# Patient Record
Sex: MALE | Race: WHITE
[De-identification: names, ages, dates, MRNs, and addresses within clinical notes are randomized per-mention and may not be internally consistent; named-entity substitution may affect disease eponyms.]

---

## 2018-09-15 ENCOUNTER — HOSPITAL ENCOUNTER (EMERGENCY)
Dept: HOSPITAL 7 - FB.ED | Age: 43
Discharge: HOME | End: 2018-09-15
Payer: COMMERCIAL

## 2018-09-15 DIAGNOSIS — Z93.6: ICD-10-CM

## 2018-09-15 DIAGNOSIS — I10: ICD-10-CM

## 2018-09-15 DIAGNOSIS — Z79.899: ICD-10-CM

## 2018-09-15 DIAGNOSIS — Z88.1: ICD-10-CM

## 2018-09-15 DIAGNOSIS — R50.9: Primary | ICD-10-CM

## 2018-09-15 PROCEDURE — 96375 TX/PRO/DX INJ NEW DRUG ADDON: CPT

## 2018-09-15 PROCEDURE — 96365 THER/PROPH/DIAG IV INF INIT: CPT

## 2018-09-15 PROCEDURE — 87040 BLOOD CULTURE FOR BACTERIA: CPT

## 2018-09-15 PROCEDURE — 83605 ASSAY OF LACTIC ACID: CPT

## 2018-09-15 PROCEDURE — 86140 C-REACTIVE PROTEIN: CPT

## 2018-09-15 PROCEDURE — 86788 WEST NILE VIRUS AB IGM: CPT

## 2018-09-15 PROCEDURE — 81001 URINALYSIS AUTO W/SCOPE: CPT

## 2018-09-15 PROCEDURE — 85025 COMPLETE CBC W/AUTO DIFF WBC: CPT

## 2018-09-15 PROCEDURE — 96366 THER/PROPH/DIAG IV INF ADDON: CPT

## 2018-09-15 PROCEDURE — 80053 COMPREHEN METABOLIC PANEL: CPT

## 2018-09-15 PROCEDURE — 71046 X-RAY EXAM CHEST 2 VIEWS: CPT

## 2018-09-15 PROCEDURE — 86789 WEST NILE VIRUS ANTIBODY: CPT

## 2018-09-15 PROCEDURE — 36415 COLL VENOUS BLD VENIPUNCTURE: CPT

## 2018-09-15 PROCEDURE — 99284 EMERGENCY DEPT VISIT MOD MDM: CPT

## 2018-09-15 PROCEDURE — 87086 URINE CULTURE/COLONY COUNT: CPT

## 2018-09-15 NOTE — EDM.PDOC
ED HPI GENERAL MEDICAL PROBLEM





- General


Chief Complaint: General


Stated Complaint: Chills, Nephrostomy Tube


Time Seen by Provider: 09/15/18 19:13


Source of Information: Reports: Patient





- History of Present Illness


INITIAL COMMENTS - FREE TEXT/NARRATIVE: 


Channing is a 43-year-old male who came in because of sudden onset of fever, 

chills and mild headache. Is that about 11 AM. He also complained of nausea. He 

had a nephrostomy tube placed in the right kidney and ureter about a week ago. 

Because [place] obstruction. Is otherwise previously healthy. He complains 

of no chest pain or shortness of breath ,or sore throat or cold symptoms.





- Related Data


 Allergies











Allergy/AdvReac Type Severity Reaction Status Date / Time


 


amoxicillin [From Augmentin] Allergy  Stomach Verified 09/15/18 20:13





   Upset  


 


clavulanic acid Allergy  Stomach Verified 09/15/18 20:13





[From Augmentin]   Upset  











Home Meds: 


 Home Meds





Lisinopril 20 mg PO DAILY 07/30/18 [History]


Multivitamin with Minerals [Multiple Vitamin] 1 tab PO DAILY 07/30/18 [History]


Simvastatin [Zocor] 20 mg PO BEDTIME 07/30/18 [History]


Levofloxacin [Levaquin] 750 mg PO DAILY #5 tablet 09/15/18 [Rx]











Past Medical History


HEENT History: Reports: Hard of Hearing


Cardiovascular History: Reports: High Cholesterol, Hypertension


Respiratory History: Reports: None


Gastrointestinal History: Reports: GERD


Genitourinary History: Reports: None


OB/GYN History: Reports: None


Musculoskeletal History: Reports: None


Neurological History: Reports: None


Psychiatric History: Reports: None


Endocrine/Metabolic History: Reports: None


Hematologic History: Reports: None


Immunologic History: Reports: None


Oncologic (Cancer) History: Reports: None


Dermatologic History: Reports: None





- Past Surgical History


HEENT Surgical History: Reports: Tonsillectomy


Male  Surgical History: Reports: Varicocele Resection





Social & Family History





- Caffeine Use


Caffeine Use: Reports: Coffee, Soda





ED ROS GENERAL





- Review of Systems


Review Of Systems: ROS reveals no pertinent complaints other than HPI.





ED EXAM, GENERAL





- Physical Exam


Exam: See Below


Exam Limited By: No Limitations


General Appearance: Alert, WD/WN


Ears: Normal External Exam


Nose: Normal Inspection, Normal Mucosa, No Blood


Throat/Mouth: Normal Inspection, Normal Lips, Normal Teeth, Normal Gums, Normal 

Oropharynx, Normal Voice, No Airway Compromise


Head: Atraumatic, Normocephalic


Neck: Normal Inspection, Supple, Non-Tender, Full Range of Motion


Respiratory/Chest: No Respiratory Distress, Lungs Clear, Normal Breath Sounds, 

No Accessory Muscle Use, Chest Non-Tender


GI/Abdominal: Normal Bowel Sounds, Soft, Non-Tender, No Organomegaly, No 

Distention, No Abnormal Bruit, No Mass, Other (Neprhostomy site is clean.)


Back Exam: Normal Inspection


Neurological: Alert, CN II-XII Intact


Psychiatric: Normal Affect


Skin Exam: Warm





Course





- Vital Signs


Text/Narrative:: 


After fluids,he was feeling better.


Last Recorded V/S: 


 Last Vital Signs











Temp  99.7 F   09/15/18 20:15


 


Pulse  104 H  09/15/18 20:15


 


Resp  18   09/15/18 20:15


 


BP  119/74   09/15/18 20:15


 


Pulse Ox  100   09/15/18 20:15














- Orders/Labs/Meds


Orders: 


 Active Orders 24 hr











 Category Date Time Status


 


 CXR [Chest 2V] [CR] Stat Exams  09/15/18 20:02 Taken


 


 CULTURE BLOOD [BC] Urgent Lab  09/15/18 19:17 Received


 


 CULTURE BLOOD [BC] Urgent Lab  09/15/18 19:25 Received


 


 CULTURE URINE [RM] Stat Lab  09/15/18 20:48 Ordered


 


 WEST NILE VIRUS ANTIBODY,SERUM Urgent Lab  09/15/18 19:17 Received


 


 Sodium Chloride 0.9% [Normal Saline] 1,000 ml Med  09/15/18 19:15 Active





 IV ASDIRECTED   


 


 Sodium Chloride 0.9% [Saline Flush] Med  09/15/18 19:06 Active





 10 ml FLUSH ASDIRECTED PRN   


 


 levoFLOXacin [Levaquin] Med  09/15/18 21:05 Once





 750 mg PO ONETIME ONE   


 


 Blood Culture x2 Reflex Set [OM.PC] Urgent Oth  09/15/18 19:06 Ordered


 


 Peripheral IV Insertion Adult [OM.PC] Routine Oth  09/15/18 19:05 Ordered








 Medication Orders





Sodium Chloride (Normal Saline)  1,000 mls @ 999 mls/hr IV ASDIRECTED [facility]


   Last Admin: 09/15/18 19:28  Dose: 999 mls/hr


Levofloxacin (Levaquin)  750 mg PO ONETIME ONE


   Stop: 09/15/18 21:06


Sodium Chloride (Saline Flush)  10 ml FLUSH ASDIRECTED PRN


   PRN Reason: Keep Vein Open








Labs: 


 Laboratory Tests











  09/15/18 09/15/18 09/15/18 Range/Units





  19:08 19:17 19:17 


 


WBC   11.8   (4.5-12.0)  X10-3/uL


 


RBC   4.76   (4.30-5.75)  x10(6)uL


 


Hgb   15.3   (11.5-15.5)  g/dL


 


Hct   44.7   (30.0-51.3)  %


 


MCV   93.9   (80-96)  fL


 


MCH   32.1   (27.7-33.6)  pg


 


MCHC   34.2   (32.2-35.4)  g/dL


 


RDW   11.4 L   (11.5-15.5)  %


 


Plt Count   212   (125-369)  X10(3)uL


 


MPV   8.5   (7.4-10.4)  fL


 


Add Manual Diff   Yes   


 


Neutrophils % (Manual)   87 H   (46-82)  %


 


Band Neutrophils %   10 H   (0-6)  %


 


Lymphocytes % (Manual)   2 L   (13-37)  %


 


Monocytes % (Manual)   1 L   (4-12)  %


 


Sodium    135  (135-145)  mmol/L


 


Potassium    4.3  (3.5-5.3)  mmol/L


 


Chloride    99 L  (100-110)  mmol/L


 


Carbon Dioxide    28  (21-32)  mmol/L


 


BUN    13  (7-18)  mg/dL


 


Creatinine    1.2  (0.70-1.30)  mg/dL


 


Est Cr Clr Drug Dosing    83.65  mL/min


 


Estimated GFR (MDRD)    > 60  (>60)  


 


BUN/Creatinine Ratio    10.8  (9-20)  


 


Glucose    118 H  ()  mg/dL


 


Lactic Acid     (0.4-2.2)  mmol/L


 


Calcium    9.0  (8.6-10.2)  mg/dL


 


Total Bilirubin    0.6  (0.1-1.3)  mg/dL


 


AST    35 H  (5-25)  IU/L


 


ALT    43 H  (12-36)  U/L


 


Alkaline Phosphatase    69  ()  IU/L


 


C-Reactive Protein     (0.5-0.9)  mg/dL


 


Total Protein    7.8  (6.0-8.0)  g/dL


 


Albumin    4.1  (3.5-5.2)  g/dL


 


Globulin    3.7  g/dL


 


Albumin/Globulin Ratio    1.1  


 


Urine Color  Yellow    (YELLOW)  


 


Urine Appearance  Clear    (CLEAR)  


 


Urine pH  7.0 H    (5.0-6.5)  


 


Ur Specific Gravity  1.005 L    (1.010-1.025)  


 


Urine Protein  Negative    (NEGATIVE)  mg/dL


 


Urine Glucose (UA)  Normal    (NEGATIVE)  mg/dL


 


Urine Ketones  Negative    (NEGATIVE)  mg/dL


 


Urine Occult Blood  Negative    (NEGATIVE)  


 


Urine Nitrite  Negative    (NEGATIVE)  


 


Urine Bilirubin  Negative    (NEGATIVE)  


 


Urine Urobilinogen  Normal    (NEGATIVE)  mg/dL


 


Ur Leukocyte Esterase  Negative    (NEGATIVE)  


 


Urine RBC  Not seen    (0)  


 


Urine WBC  0-5    (0)  


 


Ur Squamous Epith Cells  Few H    (NS,R,O)  


 


Urine Bacteria  Occasional H    (NS)  














  09/15/18 09/15/18 Range/Units





  19:17 19:17 


 


WBC    (4.5-12.0)  X10-3/uL


 


RBC    (4.30-5.75)  x10(6)uL


 


Hgb    (11.5-15.5)  g/dL


 


Hct    (30.0-51.3)  %


 


MCV    (80-96)  fL


 


MCH    (27.7-33.6)  pg


 


MCHC    (32.2-35.4)  g/dL


 


RDW    (11.5-15.5)  %


 


Plt Count    (125-369)  X10(3)uL


 


MPV    (7.4-10.4)  fL


 


Add Manual Diff    


 


Neutrophils % (Manual)    (46-82)  %


 


Band Neutrophils %    (0-6)  %


 


Lymphocytes % (Manual)    (13-37)  %


 


Monocytes % (Manual)    (4-12)  %


 


Sodium    (135-145)  mmol/L


 


Potassium    (3.5-5.3)  mmol/L


 


Chloride    (100-110)  mmol/L


 


Carbon Dioxide    (21-32)  mmol/L


 


BUN    (7-18)  mg/dL


 


Creatinine    (0.70-1.30)  mg/dL


 


Est Cr Clr Drug Dosing    mL/min


 


Estimated GFR (MDRD)    (>60)  


 


BUN/Creatinine Ratio    (9-20)  


 


Glucose    ()  mg/dL


 


Lactic Acid  1.2   (0.4-2.2)  mmol/L


 


Calcium    (8.6-10.2)  mg/dL


 


Total Bilirubin    (0.1-1.3)  mg/dL


 


AST    (5-25)  IU/L


 


ALT    (12-36)  U/L


 


Alkaline Phosphatase    ()  IU/L


 


C-Reactive Protein   3.5 H*  (0.5-0.9)  mg/dL


 


Total Protein    (6.0-8.0)  g/dL


 


Albumin    (3.5-5.2)  g/dL


 


Globulin    g/dL


 


Albumin/Globulin Ratio    


 


Urine Color    (YELLOW)  


 


Urine Appearance    (CLEAR)  


 


Urine pH    (5.0-6.5)  


 


Ur Specific Gravity    (1.010-1.025)  


 


Urine Protein    (NEGATIVE)  mg/dL


 


Urine Glucose (UA)    (NEGATIVE)  mg/dL


 


Urine Ketones    (NEGATIVE)  mg/dL


 


Urine Occult Blood    (NEGATIVE)  


 


Urine Nitrite    (NEGATIVE)  


 


Urine Bilirubin    (NEGATIVE)  


 


Urine Urobilinogen    (NEGATIVE)  mg/dL


 


Ur Leukocyte Esterase    (NEGATIVE)  


 


Urine RBC    (0)  


 


Urine WBC    (0)  


 


Ur Squamous Epith Cells    (NS,R,O)  


 


Urine Bacteria    (NS)  











Meds: 


Medications











Generic Name Dose Route Start Last Admin





  Trade Name Freq  PRN Reason Stop Dose Admin


 


Sodium Chloride  1,000 mls @ 999 mls/hr  09/15/18 19:15  09/15/18 19:28





  Normal Saline  IV   999 mls/hr





  ASDIRECTED GISSEL   Administration





     





     





     





     


 


Levofloxacin  750 mg  09/15/18 21:05  





  Levaquin  PO  09/15/18 21:06  





  ONETIME ONE   





     





     





     





     


 


Sodium Chloride  10 ml  09/15/18 19:06  





  Saline Flush  FLUSH   





  ASDIRECTED PRN   





  Keep Vein Open   





     





     





     














Discontinued Medications














Generic Name Dose Route Start Last Admin





  Trade Name Freq  PRN Reason Stop Dose Admin


 


Levofloxacin/Dextrose 750 mg/  150 mls @ 100 mls/hr  09/15/18 19:06  09/15/18 19

:34





  Premix  IV  09/15/18 20:35  100 mls/hr





  ONETIME ONE   Administration





     





     





     





     


 


Ketorolac Tromethamine  30 mg  09/15/18 20:19  09/15/18 20:34





  Toradol  IVPUSH  09/15/18 20:20  30 mg





  ONETIME ONE   Administration





     





     





     





     


 


Ondansetron HCl  4 mg  09/15/18 19:15  09/15/18 19:31





  Zofran  IVPUSH  09/15/18 19:16  4 mg





  ONETIME ONE   Administration





     





     





     





     














Departure





- Departure


Time of Disposition: 21:06


Disposition: Home, Self-Care 01


Condition: Good


Clinical Impression: 


 Fever chills, Nephrostomy status








- Discharge Information


Prescriptions: 


Levofloxacin [Levaquin] 750 mg PO DAILY #5 tablet


Referrals: 


Strand,Duane, MD [Primary Care Provider] - 


Forms:  ED Department Discharge





- Problem List & Annotations


(1) Acute febrile illness


SNOMED Code(s): 782616552


   Code(s): R50.9 - FEVER, UNSPECIFIED   Status: Acute   Current Visit: Yes   





(2) Nephrostomy status


SNOMED Code(s): 476000165, 166000382


   Code(s): Z93.6 - OTHER ARTIFICIAL OPENINGS OF URINARY TRACT STATUS   Status: 

Acute   Current Visit: Yes   





- Problem List Review


Problem List Initiated/Reviewed/Updated: Yes





- My Orders


Last 24 Hours: 


My Active Orders





09/15/18 19:05


Peripheral IV Insertion Adult [OM.PC] Routine 





09/15/18 19:06


Sodium Chloride 0.9% [Saline Flush]   10 ml FLUSH ASDIRECTED PRN 


Blood Culture x2 Reflex Set [OM.PC] Urgent 





09/15/18 19:15


Sodium Chloride 0.9% [Normal Saline] 1,000 ml IV ASDIRECTED 





09/15/18 19:17


CULTURE BLOOD [BC] Urgent 


WEST NILE VIRUS ANTIBODY,SERUM Urgent 





09/15/18 19:25


CULTURE BLOOD [BC] Urgent 





09/15/18 20:02


CXR [Chest 2V] [CR] Stat 





09/15/18 20:48


CULTURE URINE [RM] Stat 





09/15/18 21:05


levoFLOXacin [Levaquin]   750 mg PO ONETIME ONE 














- Assessment/Plan


Last 24 Hours: 


My Active Orders





09/15/18 19:05


Peripheral IV Insertion Adult [OM.PC] Routine 





09/15/18 19:06


Sodium Chloride 0.9% [Saline Flush]   10 ml FLUSH ASDIRECTED PRN 


Blood Culture x2 Reflex Set [OM.PC] Urgent 





09/15/18 19:15


Sodium Chloride 0.9% [Normal Saline] 1,000 ml IV ASDIRECTED 





09/15/18 19:17


CULTURE BLOOD [BC] Urgent 


WEST NILE VIRUS ANTIBODY,SERUM Urgent 





09/15/18 19:25


CULTURE BLOOD [BC] Urgent 





09/15/18 20:02


CXR [Chest 2V] [CR] Stat 





09/15/18 20:48


CULTURE URINE [RM] Stat 





09/15/18 21:05


levoFLOXacin [Levaquin]   750 mg PO ONETIME ONE 











Plan: 


Channing has a CRP of 3.5, and he has 87% neutrophilia, 10% bands. His UA was 

otherwise normal and so was the chest x-ray which I reviewed independently. I 

gave him 1 L of normal saline, 750 mg of IV Levaquin. My plan is to discharge 

him home on oral Levaquin, and urine and blood cultures have been  setup, along 

with West Nile virus antibodies. He has an appointment with Dr. Solis next 

week but I advised him strongly to return here or go to the ER at [facility] if 

his symptoms return or get worse.

## 2018-09-18 NOTE — CR
INDICATION:  Fever.



CHEST, PA AND LATERAL VIEWS:



FINDINGS:   No comparison studies.



The lungs and pleural spaces look clear.  The cardiac and mediastinal contours 
are normal.  Minimal degenerative end plate change and spurring throughout the 
thoracic spine.  



IMPRESSION:  No acute abnormalities shown.  

MTDD